# Patient Record
Sex: FEMALE | Race: ASIAN | NOT HISPANIC OR LATINO
[De-identification: names, ages, dates, MRNs, and addresses within clinical notes are randomized per-mention and may not be internally consistent; named-entity substitution may affect disease eponyms.]

---

## 2023-03-17 ENCOUNTER — NON-APPOINTMENT (OUTPATIENT)
Age: 38
End: 2023-03-17

## 2023-04-21 ENCOUNTER — APPOINTMENT (OUTPATIENT)
Dept: ANTEPARTUM | Facility: CLINIC | Age: 38
End: 2023-04-21
Payer: COMMERCIAL

## 2023-04-21 ENCOUNTER — ASOB RESULT (OUTPATIENT)
Age: 38
End: 2023-04-21

## 2023-04-21 PROCEDURE — 76811 OB US DETAILED SNGL FETUS: CPT

## 2023-04-21 PROCEDURE — 76817 TRANSVAGINAL US OBSTETRIC: CPT

## 2023-05-30 PROBLEM — Z00.00 ENCOUNTER FOR PREVENTIVE HEALTH EXAMINATION: Status: ACTIVE | Noted: 2023-05-30

## 2023-05-31 ENCOUNTER — ASOB RESULT (OUTPATIENT)
Age: 38
End: 2023-05-31

## 2023-05-31 ENCOUNTER — APPOINTMENT (OUTPATIENT)
Dept: ANTEPARTUM | Facility: CLINIC | Age: 38
End: 2023-05-31
Payer: COMMERCIAL

## 2023-05-31 PROCEDURE — 76816 OB US FOLLOW-UP PER FETUS: CPT

## 2023-05-31 PROCEDURE — 76819 FETAL BIOPHYS PROFIL W/O NST: CPT | Mod: 59

## 2023-06-02 ENCOUNTER — TRANSCRIPTION ENCOUNTER (OUTPATIENT)
Age: 38
End: 2023-06-02

## 2023-07-13 ENCOUNTER — ASOB RESULT (OUTPATIENT)
Age: 38
End: 2023-07-13

## 2023-07-13 ENCOUNTER — APPOINTMENT (OUTPATIENT)
Dept: ANTEPARTUM | Facility: CLINIC | Age: 38
End: 2023-07-13
Payer: COMMERCIAL

## 2023-07-13 PROCEDURE — 76816 OB US FOLLOW-UP PER FETUS: CPT

## 2023-07-13 PROCEDURE — 76819 FETAL BIOPHYS PROFIL W/O NST: CPT

## 2023-08-10 ENCOUNTER — APPOINTMENT (OUTPATIENT)
Dept: ANTEPARTUM | Facility: CLINIC | Age: 38
End: 2023-08-10
Payer: COMMERCIAL

## 2023-08-10 ENCOUNTER — ASOB RESULT (OUTPATIENT)
Age: 38
End: 2023-08-10

## 2023-08-10 PROCEDURE — 76816 OB US FOLLOW-UP PER FETUS: CPT

## 2023-08-10 PROCEDURE — 76820 UMBILICAL ARTERY ECHO: CPT | Mod: 59

## 2023-08-10 PROCEDURE — 76819 FETAL BIOPHYS PROFIL W/O NST: CPT

## 2023-08-26 ENCOUNTER — INPATIENT (INPATIENT)
Facility: HOSPITAL | Age: 38
LOS: 0 days | Discharge: HOME | End: 2023-08-27
Attending: OBSTETRICS & GYNECOLOGY | Admitting: OBSTETRICS & GYNECOLOGY
Payer: COMMERCIAL

## 2023-08-26 VITALS — WEIGHT: 143.96 LBS | HEIGHT: 64 IN

## 2023-08-26 DIAGNOSIS — Z3A.00 WEEKS OF GESTATION OF PREGNANCY NOT SPECIFIED: ICD-10-CM

## 2023-08-26 DIAGNOSIS — O26.899 OTHER SPECIFIED PREGNANCY RELATED CONDITIONS, UNSPECIFIED TRIMESTER: ICD-10-CM

## 2023-08-26 LAB
BASOPHILS # BLD AUTO: 0.03 K/UL — SIGNIFICANT CHANGE UP (ref 0–0.2)
BASOPHILS NFR BLD AUTO: 0.2 % — SIGNIFICANT CHANGE UP (ref 0–2)
BLD GP AB SCN SERPL QL: NEGATIVE — SIGNIFICANT CHANGE UP
EOSINOPHIL # BLD AUTO: 0.11 K/UL — SIGNIFICANT CHANGE UP (ref 0–0.5)
EOSINOPHIL NFR BLD AUTO: 0.8 % — SIGNIFICANT CHANGE UP (ref 0–6)
HCT VFR BLD CALC: 38.1 % — SIGNIFICANT CHANGE UP (ref 34.5–45)
HGB BLD-MCNC: 13 G/DL — SIGNIFICANT CHANGE UP (ref 11.5–15.5)
IMM GRANULOCYTES NFR BLD AUTO: 0.7 % — SIGNIFICANT CHANGE UP (ref 0–0.9)
LYMPHOCYTES # BLD AUTO: 1.73 K/UL — SIGNIFICANT CHANGE UP (ref 1–3.3)
LYMPHOCYTES # BLD AUTO: 12.2 % — LOW (ref 13–44)
MCHC RBC-ENTMCNC: 32.8 PG — SIGNIFICANT CHANGE UP (ref 27–34)
MCHC RBC-ENTMCNC: 34.1 GM/DL — SIGNIFICANT CHANGE UP (ref 32–36)
MCV RBC AUTO: 96.2 FL — SIGNIFICANT CHANGE UP (ref 80–100)
MONOCYTES # BLD AUTO: 1.04 K/UL — HIGH (ref 0–0.9)
MONOCYTES NFR BLD AUTO: 7.3 % — SIGNIFICANT CHANGE UP (ref 2–14)
NEUTROPHILS # BLD AUTO: 11.22 K/UL — HIGH (ref 1.8–7.4)
NEUTROPHILS NFR BLD AUTO: 78.8 % — HIGH (ref 43–77)
NRBC # BLD: 0 /100 WBCS — SIGNIFICANT CHANGE UP (ref 0–0)
PLATELET # BLD AUTO: 240 K/UL — SIGNIFICANT CHANGE UP (ref 150–400)
RBC # BLD: 3.96 M/UL — SIGNIFICANT CHANGE UP (ref 3.8–5.2)
RBC # FLD: 12.2 % — SIGNIFICANT CHANGE UP (ref 10.3–14.5)
RH IG SCN BLD-IMP: POSITIVE — SIGNIFICANT CHANGE UP
WBC # BLD: 14.23 K/UL — HIGH (ref 3.8–10.5)
WBC # FLD AUTO: 14.23 K/UL — HIGH (ref 3.8–10.5)

## 2023-08-26 RX ORDER — SIMETHICONE 80 MG/1
80 TABLET, CHEWABLE ORAL EVERY 4 HOURS
Refills: 0 | Status: DISCONTINUED | OUTPATIENT
Start: 2023-08-26 | End: 2023-08-27

## 2023-08-26 RX ORDER — ACETAMINOPHEN 500 MG
1000 TABLET ORAL ONCE
Refills: 0 | Status: COMPLETED | OUTPATIENT
Start: 2023-08-26 | End: 2023-08-26

## 2023-08-26 RX ORDER — HYDROCORTISONE 1 %
1 OINTMENT (GRAM) TOPICAL EVERY 6 HOURS
Refills: 0 | Status: DISCONTINUED | OUTPATIENT
Start: 2023-08-26 | End: 2023-08-27

## 2023-08-26 RX ORDER — IBUPROFEN 200 MG
600 TABLET ORAL EVERY 6 HOURS
Refills: 0 | Status: COMPLETED | OUTPATIENT
Start: 2023-08-26 | End: 2024-07-24

## 2023-08-26 RX ORDER — AER TRAVELER 0.5 G/1
1 SOLUTION RECTAL; TOPICAL EVERY 4 HOURS
Refills: 0 | Status: DISCONTINUED | OUTPATIENT
Start: 2023-08-26 | End: 2023-08-27

## 2023-08-26 RX ORDER — IBUPROFEN 200 MG
600 TABLET ORAL EVERY 6 HOURS
Refills: 0 | Status: DISCONTINUED | OUTPATIENT
Start: 2023-08-26 | End: 2023-08-27

## 2023-08-26 RX ORDER — OXYCODONE HYDROCHLORIDE 5 MG/1
5 TABLET ORAL
Refills: 0 | Status: DISCONTINUED | OUTPATIENT
Start: 2023-08-26 | End: 2023-08-27

## 2023-08-26 RX ORDER — LIDOCAINE HCL 20 MG/ML
10 VIAL (ML) INJECTION ONCE
Refills: 0 | Status: COMPLETED | OUTPATIENT
Start: 2023-08-26 | End: 2023-08-26

## 2023-08-26 RX ORDER — DIPHENHYDRAMINE HCL 50 MG
25 CAPSULE ORAL EVERY 6 HOURS
Refills: 0 | Status: DISCONTINUED | OUTPATIENT
Start: 2023-08-26 | End: 2023-08-27

## 2023-08-26 RX ORDER — LANOLIN
1 OINTMENT (GRAM) TOPICAL EVERY 6 HOURS
Refills: 0 | Status: DISCONTINUED | OUTPATIENT
Start: 2023-08-26 | End: 2023-08-27

## 2023-08-26 RX ORDER — KETOROLAC TROMETHAMINE 30 MG/ML
30 SYRINGE (ML) INJECTION ONCE
Refills: 0 | Status: DISCONTINUED | OUTPATIENT
Start: 2023-08-26 | End: 2023-08-26

## 2023-08-26 RX ORDER — DIBUCAINE 1 %
1 OINTMENT (GRAM) RECTAL EVERY 6 HOURS
Refills: 0 | Status: DISCONTINUED | OUTPATIENT
Start: 2023-08-26 | End: 2023-08-27

## 2023-08-26 RX ORDER — PRAMOXINE HYDROCHLORIDE 150 MG/15G
1 AEROSOL, FOAM RECTAL EVERY 4 HOURS
Refills: 0 | Status: DISCONTINUED | OUTPATIENT
Start: 2023-08-26 | End: 2023-08-27

## 2023-08-26 RX ORDER — SODIUM CHLORIDE 9 MG/ML
1000 INJECTION, SOLUTION INTRAVENOUS
Refills: 0 | Status: DISCONTINUED | OUTPATIENT
Start: 2023-08-26 | End: 2023-08-26

## 2023-08-26 RX ORDER — OXYTOCIN 10 UNIT/ML
333.33 VIAL (ML) INJECTION
Qty: 20 | Refills: 0 | Status: DISCONTINUED | OUTPATIENT
Start: 2023-08-26 | End: 2023-08-26

## 2023-08-26 RX ORDER — BENZOCAINE 10 %
1 GEL (GRAM) MUCOUS MEMBRANE EVERY 6 HOURS
Refills: 0 | Status: DISCONTINUED | OUTPATIENT
Start: 2023-08-26 | End: 2023-08-27

## 2023-08-26 RX ORDER — ACETAMINOPHEN 500 MG
975 TABLET ORAL
Refills: 0 | Status: DISCONTINUED | OUTPATIENT
Start: 2023-08-26 | End: 2023-08-27

## 2023-08-26 RX ORDER — CITRIC ACID/SODIUM CITRATE 300-500 MG
15 SOLUTION, ORAL ORAL EVERY 6 HOURS
Refills: 0 | Status: DISCONTINUED | OUTPATIENT
Start: 2023-08-26 | End: 2023-08-26

## 2023-08-26 RX ORDER — CHLORHEXIDINE GLUCONATE 213 G/1000ML
1 SOLUTION TOPICAL DAILY
Refills: 0 | Status: DISCONTINUED | OUTPATIENT
Start: 2023-08-26 | End: 2023-08-26

## 2023-08-26 RX ORDER — MAGNESIUM HYDROXIDE 400 MG/1
30 TABLET, CHEWABLE ORAL
Refills: 0 | Status: DISCONTINUED | OUTPATIENT
Start: 2023-08-26 | End: 2023-08-27

## 2023-08-26 RX ORDER — TETANUS TOXOID, REDUCED DIPHTHERIA TOXOID AND ACELLULAR PERTUSSIS VACCINE, ADSORBED 5; 2.5; 8; 8; 2.5 [IU]/.5ML; [IU]/.5ML; UG/.5ML; UG/.5ML; UG/.5ML
0.5 SUSPENSION INTRAMUSCULAR ONCE
Refills: 0 | Status: DISCONTINUED | OUTPATIENT
Start: 2023-08-26 | End: 2023-08-27

## 2023-08-26 RX ORDER — OXYTOCIN 10 UNIT/ML
41.67 VIAL (ML) INJECTION
Qty: 20 | Refills: 0 | Status: DISCONTINUED | OUTPATIENT
Start: 2023-08-26 | End: 2023-08-27

## 2023-08-26 RX ORDER — SODIUM CHLORIDE 9 MG/ML
3 INJECTION INTRAMUSCULAR; INTRAVENOUS; SUBCUTANEOUS EVERY 8 HOURS
Refills: 0 | Status: DISCONTINUED | OUTPATIENT
Start: 2023-08-26 | End: 2023-08-27

## 2023-08-26 RX ADMIN — Medication 30 MILLIGRAM(S): at 18:27

## 2023-08-26 RX ADMIN — Medication 400 MILLIGRAM(S): at 19:00

## 2023-08-26 RX ADMIN — Medication 10 MILLILITER(S): at 17:50

## 2023-08-26 RX ADMIN — Medication 30 MILLIGRAM(S): at 18:42

## 2023-08-26 RX ADMIN — SODIUM CHLORIDE 3 MILLILITER(S): 9 INJECTION INTRAMUSCULAR; INTRAVENOUS; SUBCUTANEOUS at 22:00

## 2023-08-26 NOTE — PATIENT PROFILE OB - FALL HARM RISK - UNIVERSAL INTERVENTIONS
Bed in lowest position, wheels locked, appropriate side rails in place/Call bell, personal items and telephone in reach/Instruct patient to call for assistance before getting out of bed or chair/Non-slip footwear when patient is out of bed/Cavour to call system/Physically safe environment - no spills, clutter or unnecessary equipment/Purposeful Proactive Rounding/Room/bathroom lighting operational, light cord in reach Bed in lowest position, wheels locked, appropriate side rails in place/Call bell, personal items and telephone in reach/Instruct patient to call for assistance before getting out of bed or chair/Non-slip footwear when patient is out of bed/[place]in to call system/Physically safe environment - no spills, clutter or unnecessary equipment/Purposeful Proactive Rounding/Room/bathroom lighting operational, light cord in reach Bed in lowest position, wheels locked, appropriate side rails in place/Call bell, personal items and telephone in reach/Instruct patient to call for assistance before getting out of bed or chair/Non-slip footwear when patient is out of bed/Kennan to call system/Physically safe environment - no spills, clutter or unnecessary equipment/Purposeful Proactive Rounding/Room/bathroom lighting operational, light cord in reach

## 2023-08-27 ENCOUNTER — TRANSCRIPTION ENCOUNTER (OUTPATIENT)
Age: 38
End: 2023-08-27

## 2023-08-27 VITALS
RESPIRATION RATE: 18 BRPM | TEMPERATURE: 99 F | HEART RATE: 83 BPM | SYSTOLIC BLOOD PRESSURE: 93 MMHG | OXYGEN SATURATION: 97 % | DIASTOLIC BLOOD PRESSURE: 60 MMHG

## 2023-08-27 LAB
T PALLIDUM AB TITR SER: NEGATIVE — SIGNIFICANT CHANGE UP

## 2023-08-27 PROCEDURE — 86900 BLOOD TYPING SEROLOGIC ABO: CPT

## 2023-08-27 PROCEDURE — 36415 COLL VENOUS BLD VENIPUNCTURE: CPT

## 2023-08-27 PROCEDURE — 86850 RBC ANTIBODY SCREEN: CPT

## 2023-08-27 PROCEDURE — 85025 COMPLETE CBC W/AUTO DIFF WBC: CPT

## 2023-08-27 PROCEDURE — 59050 FETAL MONITOR W/REPORT: CPT

## 2023-08-27 PROCEDURE — 86780 TREPONEMA PALLIDUM: CPT

## 2023-08-27 PROCEDURE — 86901 BLOOD TYPING SEROLOGIC RH(D): CPT

## 2023-08-27 PROCEDURE — 99214 OFFICE O/P EST MOD 30 MIN: CPT

## 2023-08-27 RX ORDER — IBUPROFEN 200 MG
1 TABLET ORAL
Qty: 0 | Refills: 0 | DISCHARGE
Start: 2023-08-27

## 2023-08-27 RX ORDER — ACETAMINOPHEN 500 MG
3 TABLET ORAL
Qty: 0 | Refills: 0 | DISCHARGE
Start: 2023-08-27

## 2023-08-27 RX ADMIN — Medication 975 MILLIGRAM(S): at 14:29

## 2023-08-27 RX ADMIN — Medication 1 TABLET(S): at 12:05

## 2023-08-27 RX ADMIN — Medication 600 MILLIGRAM(S): at 05:07

## 2023-08-27 RX ADMIN — Medication 600 MILLIGRAM(S): at 18:00

## 2023-08-27 RX ADMIN — SODIUM CHLORIDE 3 MILLILITER(S): 9 INJECTION INTRAMUSCULAR; INTRAVENOUS; SUBCUTANEOUS at 05:30

## 2023-08-27 RX ADMIN — Medication 600 MILLIGRAM(S): at 12:06

## 2023-08-27 RX ADMIN — Medication 600 MILLIGRAM(S): at 12:40

## 2023-08-27 RX ADMIN — Medication 975 MILLIGRAM(S): at 09:30

## 2023-08-27 RX ADMIN — Medication 600 MILLIGRAM(S): at 17:23

## 2023-08-27 RX ADMIN — Medication 975 MILLIGRAM(S): at 08:59

## 2023-08-27 RX ADMIN — Medication 975 MILLIGRAM(S): at 15:00

## 2023-08-27 NOTE — PROGRESS NOTE ADULT - ATTENDING COMMENTS
PT seen at bedside this morning PPD#1 s/p  now P2  Doing well. Rested. No complaints.   "Did not have milk" last pregnancy and was planning on formula, Discussed, Pt would like to try and see   Baby girl   B pos  Immune to R/M/V  Desires to be discharged home today  See in the office in 6wks

## 2023-08-27 NOTE — DISCHARGE NOTE OB - PATIENT PORTAL LINK FT
You can access the FollowMyHealth Patient Portal offered by [facility] by registering at the following website: [email or web address]. By joining [employer] FollowMyHealth portal, you will also be able to view your health information using other applications (apps) compatible with our system. You can access the FollowMyHealth Patient Portal offered by [facility] by registering at the following website: [email or web address]. By joining [employer]’s FollowMyHealth portal, you will also be able to view your health information using other applications (apps) compatible with our system. You can access the FollowMyHealth Patient Portal offered by [facility] by registering at the following website: [email or web address]. By joining [employer]’s FollowMyHealth portal, you will also be able to view your health information using other applications (apps) compatible with our system.

## 2023-08-27 NOTE — DISCHARGE NOTE OB - CARE PROVIDER_API CALL
Sadie Cornejo  Obstetrics and Gynecology  [address] [place] [address]  Phone: [phone]  Fax: [phone]  Follow Up Time:    Sadie Cornejo  Obstetrics and Gynecology  [address] [place] [address]  Phone: [phone]  Fax: [phone]  Follow Up Time:    Sadie Cornejo  Obstetrics and Gynecology  [address] [place] [address]  Phone: [phone]  Fax: [phone]  Follow Up Time:

## 2023-08-27 NOTE — DISCHARGE NOTE OB - NS MD DC FALL RISK RISK
For information on Fall & Injury Prevention, visit: [email or web address]-[email or web address]-mobility OR  [email or web address].[email or web address]/news/fall-prevention-tips-to-avoid-injury OR  https://www.cdc.gov/steadi/patient.html For information on Fall & Injury Prevention, visit: [email or web address]://www.[facility]/news/fall-prevention-protects-and-maintains-health-and-mobility OR  https://www.[facility]/news/fall-prevention-tips-to-avoid-injury OR  https://www.cdc.gov/steadi/patient.html For information on Fall & Injury Prevention, visit: [email or web address]://www.[email or web address]/news/fall-prevention-protects-and-maintains-health-and-mobility OR  [email or web address]://www.[facility]/news/fall-prevention-tips-to-avoid-injury OR  https://www.cdc.gov/steadi/patient.html

## 2023-08-27 NOTE — DISCHARGE NOTE OB - PROVIDER TOKENS
PROVIDER:[TOKEN:[[id number]]] PROVIDER:[TOKEN:[[id number]]] PROVIDER:[TOKEN:[[id number]]]

## 2023-08-27 NOTE — PROGRESS NOTE ADULT - ASSESSMENT
A/P 37y s/p , PPD1, stable, meeting postpartum milestones   - Pain: well controlled on tylenol/motrin  - GI: Tolerating regular diet  - : urinating without difficulty/pain  - DVT prophylaxis: ambulating frequently  - Dispo: PPD 2, unless otherwise specified

## 2023-08-27 NOTE — DISCHARGE NOTE OB - PLAN OF CARE
Follow up in the office with your doctor in six weeks. Please call to confirm your appointment. You can eat a regular diet. Please take 600mg Motrin every 6 hours and/or Tylenol 975mg every six hours as needed for pain. No heavy lifting and nothing in the vagina until cleared by your doctor. Please call your doctor with any questions or concerns.

## 2023-08-27 NOTE — PROGRESS NOTE ADULT - SUBJECTIVE AND OBJECTIVE BOX
Patient evaluated at bedside this morning, resting comfortable in bed, no acute events overnight.  She reports pain is well controlled with tylenol and motrin.  She denies headache, dizziness, chest pain, palpitations, shortness of breath, nausea, vomiting, fever, chills, heavy vaginal bleeding. She has been ambulating without assistance, voiding spontaneously.  Tolerating food well, without nausea/vomit.      Physical Exam:  T(C): 36.7 (08-27-23 @ 05:30), Max: 37.2 (08-27-23 @ 01:15)  HR: 64 (08-27-23 @ 05:30) (62 - 75)  BP: 96/63 (08-27-23 @ 05:30) (92/54 - 105/69)  RR: 17 (08-27-23 @ 01:15) (17 - 17)  SpO2: 100% (08-27-23 @ 05:30) (96% - 100%)    GA: NAD  Pulm: no increased work of breathing  Abd: soft, nontender, nondistended, no rebound or guarding, uterus firm.  Extremities: no swelling or calf tenderness                          13.0   14.23 )-----------( 240      ( 26 Aug 2023 17:04 )             38.1           acetaminophen     Tablet .. 975 milliGRAM(s) Oral <User Schedule>  benzocaine 20%/menthol 0.5% Spray 1 Spray(s) Topical every 6 hours PRN  dibucaine 1% Ointment 1 Application(s) Topical every 6 hours PRN  diphenhydrAMINE 25 milliGRAM(s) Oral every 6 hours PRN  diphtheria/tetanus/pertussis (acellular) Vaccine (Adacel) 0.5 milliLiter(s) IntraMuscular once  hydrocortisone 1% Cream 1 Application(s) Topical every 6 hours PRN  ibuprofen  Tablet. 600 milliGRAM(s) Oral every 6 hours  lanolin Ointment 1 Application(s) Topical every 6 hours PRN  magnesium hydroxide Suspension 30 milliLiter(s) Oral two times a day PRN  oxyCODONE    IR 5 milliGRAM(s) Oral every 3 hours PRN  oxytocin Infusion 41.667 milliUNIT(s)/Min IV Continuous <Continuous>  pramoxine 1%/zinc 5% Cream 1 Application(s) Topical every 4 hours PRN  prenatal multivitamin 1 Tablet(s) Oral daily  simethicone 80 milliGRAM(s) Chew every 4 hours PRN  sodium chloride 0.9% lock flush 3 milliLiter(s) IV Push every 8 hours  witch hazel Pads 1 Application(s) Topical every 4 hours PRN

## 2023-08-27 NOTE — DISCHARGE NOTE OB - CARE PLAN
Principal Discharge DX:	Postpartum state  Assessment and plan of treatment:	Follow up in the office with your doctor in six weeks. Please call to confirm your appointment. You can eat a regular diet. Please take 600mg Motrin every 6 hours and/or Tylenol 975mg every six hours as needed for pain. No heavy lifting and nothing in the vagina until cleared by your doctor. Please call your doctor with any questions or concerns.   1

## 2023-08-31 DIAGNOSIS — Z3A.38 38 WEEKS GESTATION OF PREGNANCY: ICD-10-CM
